# Patient Record
Sex: FEMALE | HISPANIC OR LATINO | Employment: PART TIME | URBAN - METROPOLITAN AREA
[De-identification: names, ages, dates, MRNs, and addresses within clinical notes are randomized per-mention and may not be internally consistent; named-entity substitution may affect disease eponyms.]

---

## 2019-06-10 ENCOUNTER — OFFICE VISIT (OUTPATIENT)
Dept: BARIATRICS | Facility: CLINIC | Age: 44
End: 2019-06-10
Payer: COMMERCIAL

## 2019-06-10 VITALS
BODY MASS INDEX: 28.14 KG/M2 | HEART RATE: 98 BPM | WEIGHT: 158.8 LBS | TEMPERATURE: 97.2 F | SYSTOLIC BLOOD PRESSURE: 124 MMHG | RESPIRATION RATE: 18 BRPM | HEIGHT: 63 IN | DIASTOLIC BLOOD PRESSURE: 64 MMHG

## 2019-06-10 DIAGNOSIS — F33.41 RECURRENT MAJOR DEPRESSION IN PARTIAL REMISSION (HCC): ICD-10-CM

## 2019-06-10 DIAGNOSIS — J45.41 MODERATE PERSISTENT ASTHMA WITH ACUTE EXACERBATION: ICD-10-CM

## 2019-06-10 DIAGNOSIS — E66.3 OVERWEIGHT: Primary | ICD-10-CM

## 2019-06-10 DIAGNOSIS — K21.9 GERD (GASTROESOPHAGEAL REFLUX DISEASE): ICD-10-CM

## 2019-06-10 PROBLEM — K59.00 CONSTIPATION: Status: ACTIVE | Noted: 2018-04-26

## 2019-06-10 PROBLEM — R06.83 SNORING: Status: ACTIVE | Noted: 2018-10-26

## 2019-06-10 PROBLEM — Z30.41 SURVEILLANCE OF CONTRACEPTIVE PILL: Status: ACTIVE | Noted: 2017-04-10

## 2019-06-10 PROCEDURE — 99243 OFF/OP CNSLTJ NEW/EST LOW 30: CPT | Performed by: FAMILY MEDICINE

## 2019-06-10 RX ORDER — RANITIDINE 150 MG/1
150 CAPSULE ORAL 2 TIMES DAILY
COMMUNITY
Start: 2019-05-02 | End: 2020-05-01

## 2019-06-10 RX ORDER — DULOXETIN HYDROCHLORIDE 60 MG/1
20 CAPSULE, DELAYED RELEASE ORAL DAILY
COMMUNITY

## 2019-06-10 RX ORDER — ALBUTEROL SULFATE 1.25 MG/3ML
1.25 SOLUTION RESPIRATORY (INHALATION) EVERY 6 HOURS PRN
COMMUNITY
Start: 2019-06-06 | End: 2020-06-05

## 2019-06-10 RX ORDER — FLUTICASONE FUROATE AND VILANTEROL 100; 25 UG/1; UG/1
1 POWDER RESPIRATORY (INHALATION) DAILY
COMMUNITY
Start: 2019-04-30

## 2019-10-24 ENCOUNTER — OFFICE VISIT (OUTPATIENT)
Dept: OTOLARYNGOLOGY | Facility: CLINIC | Age: 44
End: 2019-10-24
Payer: COMMERCIAL

## 2019-10-24 VITALS
BODY MASS INDEX: 25.96 KG/M2 | HEIGHT: 65 IN | RESPIRATION RATE: 17 BRPM | SYSTOLIC BLOOD PRESSURE: 110 MMHG | DIASTOLIC BLOOD PRESSURE: 65 MMHG | HEART RATE: 71 BPM | WEIGHT: 155.8 LBS

## 2019-10-24 DIAGNOSIS — R06.02 SHORTNESS OF BREATH: ICD-10-CM

## 2019-10-24 DIAGNOSIS — E06.9 THYROIDITIS: ICD-10-CM

## 2019-10-24 DIAGNOSIS — J98.8 AIRWAY COMPROMISE: Primary | ICD-10-CM

## 2019-10-24 PROCEDURE — 99204 OFFICE O/P NEW MOD 45 MIN: CPT | Performed by: SPECIALIST

## 2019-10-24 PROCEDURE — 31575 DIAGNOSTIC LARYNGOSCOPY: CPT | Performed by: SPECIALIST

## 2019-10-24 NOTE — PROGRESS NOTES
Otolaryngology Head and Neck Surgery History and Physical    Chief complaint    Chief Complaint   Patient presents with    Problems to breath        History of the Present Illness    Jesusita Feng is a 38 y o  who presents for evaluation of complaints of having some persistent fatigue along with what she describes as having difficulty with breathing over the last month  She reports that she was tried on some inhalers without any improvement  She feels that is sometimes difficult to move air  She does have some mild tenderness in the neck  Patient did have some thyroid blood tests which showed markedly elevated thyroid antibodies and thyroid stimulating immunoglobulin  She is scheduled to see Endocrinology on November 27th  She has not had any history of any head trauma or neck trauma  No history of any previous throat surgery  No complaints of any hoarseness  No issues with breathing through the airway at rest   Does report that at night she has been getting some issues with some rashes and some hives  She does take an antihistamine which resolved those  Patient did have a thyroid ultrasound which showed some thyroid nodules however the ultrasound was not interpreted as to the point system to decide whether the ultrasound appearance indicated the need for biopsy          Review of Systems   Constitutional: Positive for fatigue  HENT: Positive for congestion, sinus pressure and sinus pain  Eyes: Positive for visual disturbance  Respiratory: Positive for shortness of breath  Cardiovascular: Negative  Gastrointestinal: Negative  Endocrine: Negative  Genitourinary: Negative  Musculoskeletal: Positive for back pain and neck pain  Skin: Negative  Allergic/Immunologic: Negative  Neurological: Positive for dizziness and headaches  Hematological: Negative  Psychiatric/Behavioral: Negative          Reviewed by TK    Past Medical History:   Diagnosis Date    Asthma        Past Surgical History:   Procedure Laterality Date     SECTION         Social History     Socioeconomic History    Marital status: Unknown     Spouse name: Not on file    Number of children: Not on file    Years of education: Not on file    Highest education level: Not on file   Occupational History    Not on file   Social Needs    Financial resource strain: Not on file    Food insecurity:     Worry: Not on file     Inability: Not on file    Transportation needs:     Medical: Not on file     Non-medical: Not on file   Tobacco Use    Smoking status: Never Smoker    Smokeless tobacco: Never Used   Substance and Sexual Activity    Alcohol use: Not on file    Drug use: Not on file    Sexual activity: Not on file   Lifestyle    Physical activity:     Days per week: Not on file     Minutes per session: Not on file    Stress: Not on file   Relationships    Social connections:     Talks on phone: Not on file     Gets together: Not on file     Attends Buddhist service: Not on file     Active member of club or organization: Not on file     Attends meetings of clubs or organizations: Not on file     Relationship status: Not on file    Intimate partner violence:     Fear of current or ex partner: Not on file     Emotionally abused: Not on file     Physically abused: Not on file     Forced sexual activity: Not on file   Other Topics Concern    Not on file   Social History Narrative    Not on file       Family History   Problem Relation Age of Onset    Diabetes Mother     Diabetes Father            /65 (BP Location: Right arm, Patient Position: Sitting, Cuff Size: Standard)   Pulse 71   Resp 17   Ht 5' 5" (1 651 m)   Wt 70 7 kg (155 lb 12 8 oz)   BMI 25 93 kg/m²       Current Outpatient Medications:     DULoxetine (CYMBALTA) 60 mg delayed release capsule, Take 20 mg by mouth daily, Disp: , Rfl:     Fluticasone Propionate, Inhal, (FLOVENT DISKUS) 100 MCG/BLIST AEPB, Inhale 2 (two) times a day, Disp: , Rfl:     fluticasone-vilanterol (BREO ELLIPTA) 100-25 mcg/inh inhaler, Inhale 1 puff daily, Disp: , Rfl:     albuterol (ACCUNEB) 1 25 MG/3ML nebulizer solution, Inhale 1 25 mg every 6 (six) hours as needed, Disp: , Rfl:     ranitidine (ZANTAC) 150 MG capsule, Take 150 mg by mouth 2 (two) times a day, Disp: , Rfl:      Physical Exam   Constitutional: She is oriented to person, place, and time  She appears well-developed and well-nourished  HENT:   Head: Normocephalic and atraumatic  Right Ear: External ear normal  No drainage, swelling or tenderness  No mastoid tenderness  Tympanic membrane is not injected and not perforated  Tympanic membrane mobility is normal  No middle ear effusion  Left Ear: External ear normal  No drainage, swelling or tenderness  No mastoid tenderness  Tympanic membrane is not injected and not perforated  Tympanic membrane mobility is normal   No middle ear effusion  Nose: Nose normal  No mucosal edema, rhinorrhea, sinus tenderness, nasal deformity or septal deviation  Right sinus exhibits no maxillary sinus tenderness and no frontal sinus tenderness  Left sinus exhibits no maxillary sinus tenderness and no frontal sinus tenderness  Mouth/Throat: Uvula is midline and oropharynx is clear and moist  Mucous membranes are not pale and not dry  No oral lesions  Normal dentition  Eyes: Pupils are equal, round, and reactive to light  Conjunctivae and EOM are normal    Neck: Normal range of motion  Neck supple  No JVD present  No tracheal tenderness present  No tracheal deviation present  No thyromegaly present  Cardiovascular:   Carotid normal   Jugular no distension   Pulmonary/Chest: Breath sounds normal  No accessory muscle usage or stridor  No tachypnea  No respiratory distress  Abdominal: Soft  She exhibits no distension  Musculoskeletal: Normal range of motion  Lymphadenopathy:     She has no cervical adenopathy     Neurological: She is alert and oriented to person, place, and time  She has normal strength  No cranial nerve deficit  Skin: Skin is warm and intact  No rash noted  No erythema  Psychiatric: She has a normal mood and affect  Her behavior is normal  Judgment and thought content normal    Vitals reviewed  Procedure:    Laryngoscopy      Date/Time 10/24/19  4:56 PM        Performed by  Starleen Castleman, MD      Authorized by Starleen Castleman, MD       Universal Protocol Risks and benefits: risks, benefits and alternatives were discussed  Consent given by: patient  Patient understanding: patient states understanding of the procedure being performed                 Anesthesia    Local anesthesia used: yes  Local anesthetic: 4% lidocaine/Afrin  Procedure Details    Procedure Notes: Scope passed through the left nasal cavity  nasopharyngeal soft tissues normal  Eustachian tubes clear bilaterally   Base of tongue normal lingual tonsillar tissue  Valleculae clear without cysts  Larynx    Epiglottis normal  Supraglottic larynx normal without mucosal lesions  Right aryepiglottic fold normal  Left aryepiglottic fold normal   Right vocal cord normal  Left vocal cord normal  Normal vocal cord mobility  Into arytenoid and post cricoid region clear without inflammation    Hypopharynx  Pyriorm sinuses clear without secretions or lesions  No swelling in the pharyngeal post cricoid area  Patient tolerance: Patient tolerated the procedure well with no immediate complications              Imaging studies:      Pertinent laboratory data:      Assessment and plan:    1  Airway compromise     2  Thyroiditis     3  Shortness of breath         I discussed with the patient that there was no evidence of any upper airway obstruction  The larynx and upper trachea  Clear  I suspect her issue may be more related to her problems with her thyroid  We discussed that this also could be contributing to perhaps or shortness of breath and even her skin rash    At this point I told her this needs to be evaluated by the endocrinologist decide on treatment  This is not part of the care that would be delivered by us    I recommended she have any additional issues she contact her primary care physician